# Patient Record
Sex: FEMALE | Race: BLACK OR AFRICAN AMERICAN | NOT HISPANIC OR LATINO | Employment: OTHER | URBAN - METROPOLITAN AREA
[De-identification: names, ages, dates, MRNs, and addresses within clinical notes are randomized per-mention and may not be internally consistent; named-entity substitution may affect disease eponyms.]

---

## 2017-10-11 PROBLEM — E78.5 HYPERLIPIDEMIA: Status: ACTIVE | Noted: 2017-10-11

## 2017-10-23 PROBLEM — N39.0 UTI (URINARY TRACT INFECTION): Status: ACTIVE | Noted: 2017-10-23

## 2017-11-16 ENCOUNTER — HOSPITAL ENCOUNTER (OUTPATIENT)
Dept: RADIOLOGY | Facility: HOSPITAL | Age: 72
Discharge: HOME | End: 2017-11-16
Attending: INTERNAL MEDICINE
Payer: MEDICARE

## 2017-11-16 DIAGNOSIS — R07.89 CHEST TIGHTNESS: ICD-10-CM

## 2017-11-16 PROCEDURE — 71020 XR CHEST PA AND LATERAL: CPT | Mod: 26,,, | Performed by: RADIOLOGY

## 2017-11-16 PROCEDURE — 71020 XR CHEST PA AND LATERAL: CPT | Mod: TC,PO

## 2018-03-09 PROBLEM — I10 ESSENTIAL HYPERTENSION, BENIGN: Status: ACTIVE | Noted: 2018-03-09

## 2018-03-09 PROBLEM — E11.00 TYPE 2 DIABETES MELLITUS WITH HYPEROSMOLARITY WITHOUT COMA, WITHOUT LONG-TERM CURRENT USE OF INSULIN: Status: ACTIVE | Noted: 2018-03-09

## 2018-03-19 PROBLEM — E83.52 HYPERCALCEMIA: Status: ACTIVE | Noted: 2018-03-19

## 2018-09-11 PROBLEM — E03.9 ACQUIRED HYPOTHYROIDISM: Status: ACTIVE | Noted: 2018-09-11

## 2019-01-17 PROBLEM — E11.649 UNCONTROLLED TYPE 2 DIABETES MELLITUS WITH HYPOGLYCEMIA WITHOUT COMA, WITH LONG-TERM CURRENT USE OF INSULIN: Status: ACTIVE | Noted: 2018-03-09

## 2019-01-17 PROBLEM — M21.372 ACQUIRED LEFT FOOT DROP: Status: ACTIVE | Noted: 2019-01-17

## 2019-01-17 PROBLEM — Z00.00 ANNUAL PHYSICAL EXAM: Status: ACTIVE | Noted: 2019-01-17

## 2019-01-17 PROBLEM — E16.2 HYPOGLYCEMIA: Status: ACTIVE | Noted: 2019-01-17

## 2019-01-17 PROBLEM — Z11.59 ENCOUNTER FOR HEPATITIS C SCREENING TEST FOR LOW RISK PATIENT: Status: ACTIVE | Noted: 2019-01-17

## 2019-01-17 PROBLEM — E55.9 VITAMIN D DEFICIENCY: Status: ACTIVE | Noted: 2019-01-17

## 2019-01-17 PROBLEM — M54.16 LUMBAR RADICULOPATHY: Status: ACTIVE | Noted: 2019-01-17

## 2019-01-17 PROBLEM — Z79.4 UNCONTROLLED TYPE 2 DIABETES MELLITUS WITH HYPOGLYCEMIA WITHOUT COMA, WITH LONG-TERM CURRENT USE OF INSULIN: Status: ACTIVE | Noted: 2018-03-09

## 2019-04-22 PROBLEM — Z00.00 ANNUAL PHYSICAL EXAM: Status: RESOLVED | Noted: 2019-01-17 | Resolved: 2019-04-22

## 2019-08-24 PROBLEM — M79.605 LEG PAIN, BILATERAL: Status: ACTIVE | Noted: 2019-08-24

## 2019-08-24 PROBLEM — M79.604 LEG PAIN, BILATERAL: Status: ACTIVE | Noted: 2019-08-24

## 2020-10-07 PROBLEM — E83.52 HYPERCALCEMIA: Status: RESOLVED | Noted: 2018-03-19 | Resolved: 2020-10-07

## 2020-10-07 PROBLEM — M79.604 LEG PAIN, BILATERAL: Status: RESOLVED | Noted: 2019-08-24 | Resolved: 2020-10-07

## 2020-10-07 PROBLEM — M79.605 LEG PAIN, BILATERAL: Status: RESOLVED | Noted: 2019-08-24 | Resolved: 2020-10-07

## 2020-10-07 PROBLEM — E16.2 HYPOGLYCEMIA: Status: RESOLVED | Noted: 2019-01-17 | Resolved: 2020-10-07

## 2020-10-07 PROBLEM — R80.9 MICROALBUMINURIA: Status: ACTIVE | Noted: 2020-10-07

## 2020-10-07 PROBLEM — N39.0 UTI (URINARY TRACT INFECTION): Status: RESOLVED | Noted: 2017-10-23 | Resolved: 2020-10-07

## 2022-01-05 PROBLEM — M85.89 OSTEOPENIA OF MULTIPLE SITES: Status: ACTIVE | Noted: 2021-02-13

## 2023-05-25 PROBLEM — E11.42 DIABETIC POLYNEUROPATHY ASSOCIATED WITH TYPE 2 DIABETES MELLITUS: Status: ACTIVE | Noted: 2023-05-25

## 2023-05-25 PROBLEM — E11.22 HYPERTENSION ASSOCIATED WITH STAGE 3 CHRONIC KIDNEY DISEASE DUE TO TYPE 2 DIABETES MELLITUS: Status: ACTIVE | Noted: 2018-03-09

## 2023-05-25 PROBLEM — E11.22 TYPE 2 DM WITH CKD STAGE 3 AND HYPERTENSION: Status: ACTIVE | Noted: 2018-03-09

## 2023-05-25 PROBLEM — I12.9 HYPERTENSION ASSOCIATED WITH STAGE 3 CHRONIC KIDNEY DISEASE DUE TO TYPE 2 DIABETES MELLITUS: Status: ACTIVE | Noted: 2018-03-09

## 2023-05-25 PROBLEM — I63.81 CEREBROVASCULAR ACCIDENT (CVA) DUE TO OCCLUSION OF SMALL ARTERY: Status: ACTIVE | Noted: 2022-01-26

## 2023-05-25 PROBLEM — N18.30 HYPERTENSION ASSOCIATED WITH STAGE 3 CHRONIC KIDNEY DISEASE DUE TO TYPE 2 DIABETES MELLITUS: Status: ACTIVE | Noted: 2018-03-09

## 2023-05-25 PROBLEM — R94.31 ABNORMAL EKG: Status: ACTIVE | Noted: 2023-05-25

## 2023-05-25 PROBLEM — H49.9: Status: ACTIVE | Noted: 2023-05-25

## 2023-05-25 PROBLEM — E11.42 TYPE 2 DIABETES MELLITUS WITH DIABETIC POLYNEUROPATHY, WITH LONG-TERM CURRENT USE OF INSULIN: Status: ACTIVE | Noted: 2018-03-09

## 2023-05-25 PROBLEM — I45.10 RBBB: Status: ACTIVE | Noted: 2023-05-25

## 2023-05-25 PROBLEM — N18.30 TYPE 2 DM WITH CKD STAGE 3 AND HYPERTENSION: Status: ACTIVE | Noted: 2018-03-09

## 2023-05-25 PROBLEM — H49.22 LEFT ABDUCENS NERVE PALSY: Status: ACTIVE | Noted: 2022-01-26

## 2023-05-25 PROBLEM — I12.9 TYPE 2 DM WITH CKD STAGE 3 AND HYPERTENSION: Status: ACTIVE | Noted: 2018-03-09

## 2023-07-26 ENCOUNTER — CLINICAL SUPPORT (OUTPATIENT)
Dept: AUDIOLOGY | Facility: CLINIC | Age: 78
End: 2023-07-26
Payer: MEDICARE

## 2023-07-26 ENCOUNTER — OFFICE VISIT (OUTPATIENT)
Dept: OTOLARYNGOLOGY | Facility: CLINIC | Age: 78
End: 2023-07-26
Payer: MEDICARE

## 2023-07-26 VITALS — BODY MASS INDEX: 23.41 KG/M2 | WEIGHT: 142.88 LBS

## 2023-07-26 DIAGNOSIS — H92.03 OTALGIA OF BOTH EARS: ICD-10-CM

## 2023-07-26 DIAGNOSIS — H61.23 BILATERAL IMPACTED CERUMEN: ICD-10-CM

## 2023-07-26 DIAGNOSIS — H91.90 SUBJECTIVE HEARING LOSS: ICD-10-CM

## 2023-07-26 DIAGNOSIS — H61.23 BILATERAL IMPACTED CERUMEN: Primary | ICD-10-CM

## 2023-07-26 PROCEDURE — 99204 PR OFFICE/OUTPT VISIT, NEW, LEVL IV, 45-59 MIN: ICD-10-PCS | Mod: 25,S$GLB,, | Performed by: OTOLARYNGOLOGY

## 2023-07-26 PROCEDURE — 3288F FALL RISK ASSESSMENT DOCD: CPT | Mod: CPTII,S$GLB,, | Performed by: OTOLARYNGOLOGY

## 2023-07-26 PROCEDURE — 1100F PTFALLS ASSESS-DOCD GE2>/YR: CPT | Mod: CPTII,S$GLB,, | Performed by: OTOLARYNGOLOGY

## 2023-07-26 PROCEDURE — 3288F PR FALLS RISK ASSESSMENT DOCUMENTED: ICD-10-PCS | Mod: CPTII,S$GLB,, | Performed by: OTOLARYNGOLOGY

## 2023-07-26 PROCEDURE — 99999 PR PBB SHADOW E&M-EST. PATIENT-LVL I: CPT | Mod: PBBFAC,,, | Performed by: AUDIOLOGIST

## 2023-07-26 PROCEDURE — 69210 REMOVE IMPACTED EAR WAX UNI: CPT | Mod: S$GLB,,, | Performed by: OTOLARYNGOLOGY

## 2023-07-26 PROCEDURE — 99999 PR PBB SHADOW E&M-EST. PATIENT-LVL I: ICD-10-PCS | Mod: PBBFAC,,, | Performed by: AUDIOLOGIST

## 2023-07-26 PROCEDURE — 1159F MED LIST DOCD IN RCRD: CPT | Mod: CPTII,S$GLB,, | Performed by: OTOLARYNGOLOGY

## 2023-07-26 PROCEDURE — 1159F PR MEDICATION LIST DOCUMENTED IN MEDICAL RECORD: ICD-10-PCS | Mod: CPTII,S$GLB,, | Performed by: OTOLARYNGOLOGY

## 2023-07-26 PROCEDURE — 99999 PR PBB SHADOW E&M-EST. PATIENT-LVL III: CPT | Mod: PBBFAC,,, | Performed by: OTOLARYNGOLOGY

## 2023-07-26 PROCEDURE — 99999 PR PBB SHADOW E&M-EST. PATIENT-LVL III: ICD-10-PCS | Mod: PBBFAC,,, | Performed by: OTOLARYNGOLOGY

## 2023-07-26 PROCEDURE — 69210 PR REMOVAL IMPACTED CERUMEN REQUIRING INSTRUMENTATION, UNILATERAL: ICD-10-PCS | Mod: S$GLB,,, | Performed by: OTOLARYNGOLOGY

## 2023-07-26 PROCEDURE — 99204 OFFICE O/P NEW MOD 45 MIN: CPT | Mod: 25,S$GLB,, | Performed by: OTOLARYNGOLOGY

## 2023-07-26 PROCEDURE — 1100F PR PT FALLS ASSESS DOC 2+ FALLS/FALL W/INJURY/YR: ICD-10-PCS | Mod: CPTII,S$GLB,, | Performed by: OTOLARYNGOLOGY

## 2023-07-26 NOTE — PROGRESS NOTES
Cristina Jenkins was seen 07/26/2023 for an audiological evaluation. Patient complains of right ear decreased hearing and constant tinnitus.  Otoscopy revealed bilateral cerumen impaction. She will see ENT for cleaning and testing will be done at a later date.

## 2023-07-26 NOTE — PROGRESS NOTES
REFERRING PROVIDER  Jackie Prado Md  [address] [place] [address]  Subjective:   Patient: Cristina Jenkins [id number], :1945   Visit date:2023 4:08 PM    Chief Complaint:  Hearing Loss (Hearing loss in right ear) and Cerumen Impaction (Impaction in both ears)        HPI:     Cristina Jenkins is a 77 y.o. female whom I am asked to see for evaluation of otalgia or hearing loss in both ears for the past 1-4 weeks.   Cristina rates the severity as moderate.  No exacerbating or relieving factors.  There is no drainage from the ears.      Her meds, allergies, medical, surgical, social & family histories were reviewed & updated:  -     She has a current medication list which includes the following prescription(s): amlodipine, aspirin, bd insulin syringe ultra-fine, blood sugar diagnostic, blood-glucose meter, cholecalciferol (vitamin d3), empagliflozin, humalog mix 75-25(u-100)insuln, irbesartan, pen needle, diabetic, and rosuvastatin.  -     She  has a past medical history of Diabetes mellitus, type 2, Essential hypertension, Herpes zoster, Hyperlipidemia, Hypertension, Menopausal and postmenopausal disorder, Type 2 diabetes mellitus, and Vitamin D deficiency.   -     She does not have any pertinent problems on file.   -     She  has a past surgical history that includes Cholecystectomy; nerve entrapment (Left); Hysterectomy; and Drop Foot Surgery (2017).  -     She  reports that she has never smoked. She has never used smokeless tobacco. She reports that she does not drink alcohol and does not use drugs.  -     Her family history includes Diabetes in her maternal grandmother and mother; Hypertension in her daughter, maternal grandmother, mother, and sister; Stroke in her maternal grandmother.  -     She is allergic to penicillins.      Review of Systems:  -     Allergic/Immunologic: is allergic to penicillins..  -     Constitutional: Current temp:          Objective:     Physical  Exam:  Vitals:  Wt 64.8 kg (142 lb 13.7 oz)   BMI 23.41 kg/m²   Communication:  Able to communicate, no hoarseness.  Head & Face:  Normocephalic, atraumatic, no sinus tenderness.  Eyes:  Extraocular motions intact.  Ears:  Otoscopy of external auditory canals reveals impaction of bilateral ear canals.  With the patient in the supine position, we used the operating microscope to examine both ears with the appropriate sized ear speculum.  A variety of sterile, micro-instruments were utilized to remove the cerumen atraumatically from the impacted ear(s).   After removal, the ears were reexamined-  Right Ear:  No mass/lesion of auricle. The external auditory canals is without erythema or discharge. Pneumatic otoscopy of the tympanic membrane revealed no perforation and good mobility, with no fluid in middle ear. Clinical speech reception thresholds grossly normal.  Left Ear:  No mass/lesion of auricle. The external auditory canals is without erythema or discharge. Pneumatic otoscopy of the tympanic membrane revealed no perforation and good mobility, with no fluid in middle ear. Clinical speech reception thresholds grossly normal  Nose:  No masses/lesions of external nose, nasal mucosa, septum, and turbinates were within normal limits.  Mouth:  No mass/lesion of lips, teeth, gums, hard/soft palate, tongue, tonsils, or oropharynx.  Neck & Lymphatics:  No cervical lymphadenopathy, no neck mass/crepitus/ asymmetry, trachea is midline, no thyroid enlargement/tenderness/mass.  Neuro/Psych: Alert with normal mood and affect.   Respiration/Chest:  Symmetric expansion during respiration, normal respiratory effort.  Skin:  Warm and intact.    Assessment & Plan:       -     Cerumen Impaction - Cristina has cerumen impaction.  We discussed preventative measures and treatment options.  Q-tips must be avoided, instead the ears can be cleaned with OTC ear rinses (or a mixture of alcohol & vinegar in equal parts).   For hard wax,  Cristina may place mineral oil/baby oil in the ear with a cotton ball at night and remove in the shower.  This will assist in softening the wax and allow it to drain out on its own. If the cerumen impacts the ear canal and causes hearing loss or infection she needs to follow-up in the clinic for treatment and cleaning.    We attempted to clear her ear canals of cerumen for the better part of an hour without success.  We were able to clear the left side but the right side continues to be obstructed.  We agreed to treat with Debrox for 1 week and return for completion of debridement.

## 2023-08-09 ENCOUNTER — OFFICE VISIT (OUTPATIENT)
Dept: OTOLARYNGOLOGY | Facility: CLINIC | Age: 78
End: 2023-08-09
Payer: MEDICARE

## 2023-08-09 VITALS — TEMPERATURE: 98 F | HEIGHT: 66 IN | WEIGHT: 140.63 LBS | BODY MASS INDEX: 22.6 KG/M2

## 2023-08-09 DIAGNOSIS — H61.21 IMPACTED CERUMEN OF RIGHT EAR: Primary | ICD-10-CM

## 2023-08-09 PROCEDURE — 99499 UNLISTED E&M SERVICE: CPT | Mod: S$GLB,,, | Performed by: PHYSICIAN ASSISTANT

## 2023-08-09 PROCEDURE — 99499 NO LOS: ICD-10-PCS | Mod: S$GLB,,, | Performed by: PHYSICIAN ASSISTANT

## 2023-08-09 PROCEDURE — 69210 PR REMOVAL IMPACTED CERUMEN REQUIRING INSTRUMENTATION, UNILATERAL: ICD-10-PCS | Mod: S$GLB,,, | Performed by: PHYSICIAN ASSISTANT

## 2023-08-09 PROCEDURE — 99999 PR PBB SHADOW E&M-EST. PATIENT-LVL III: ICD-10-PCS | Mod: PBBFAC,,, | Performed by: PHYSICIAN ASSISTANT

## 2023-08-09 PROCEDURE — 69210 REMOVE IMPACTED EAR WAX UNI: CPT | Mod: S$GLB,,, | Performed by: PHYSICIAN ASSISTANT

## 2023-08-09 PROCEDURE — 99999 PR PBB SHADOW E&M-EST. PATIENT-LVL III: CPT | Mod: PBBFAC,,, | Performed by: PHYSICIAN ASSISTANT

## 2023-08-09 NOTE — PROGRESS NOTES
Subjective:   Cerumen impactions     Patient ID: Cristina Jenkins is a 77 y.o. female.    Chief Complaint:  Excessive ear wax     Cristina Jenkins is a 77 y.o. female here to see me today for repeat ear cleaning for wax impaction in right ear.  She has complaints of hearing loss in the affected ear, but denies pain or drainage.  This has been an issue in the past.  The patient has been using Debrox to soften the wax since her visit with Dr. Rivers on 7/26/23.    HPI  Review of Systems   HENT: Positive for hearing loss. Negative for ear discharge, ear pain and tinnitus.        Objective:     Physical Exam   HENT:   Right Ear: External ear and ear canal normal. Decreased hearing is noted.   Left Ear: External ear and ear canal normal. Decreased hearing is noted.   RIGHT complete cerumen impaction, removal described below       Procedure Note    CHIEF COMPLAINT:  Cerumen Impaction    Description:  The patient was seated in an exam chair.  An ear speculum was placed in the right EAC and was examined under the microscope.  Suction and/or loop curettes were used to remove a large cerumen impaction.  The tympanic membrane was visualized and was normal in appearance.  The patient tolerated the procedure well.           Assessment:     1. Impacted cerumen of right ear        Plan:     1.  Cerumen impaction:  Removed today without difficulty.  I would recommend the use of a wax softening drop, either over the counter Debrox or mineral oil, on a weekly basis.  I also instructed the patient to avoid Qtips.  RTC as needed for ear cleaning.  Audiogram when motivated.

## 2023-08-28 PROBLEM — I63.81 CEREBROVASCULAR ACCIDENT (CVA) DUE TO OCCLUSION OF SMALL ARTERY: Status: RESOLVED | Noted: 2022-01-26 | Resolved: 2023-08-28
